# Patient Record
Sex: MALE | Race: WHITE | NOT HISPANIC OR LATINO | Employment: FULL TIME | URBAN - METROPOLITAN AREA
[De-identification: names, ages, dates, MRNs, and addresses within clinical notes are randomized per-mention and may not be internally consistent; named-entity substitution may affect disease eponyms.]

---

## 2021-07-31 ENCOUNTER — APPOINTMENT (EMERGENCY)
Dept: RADIOLOGY | Facility: HOSPITAL | Age: 51
End: 2021-07-31

## 2021-07-31 ENCOUNTER — HOSPITAL ENCOUNTER (EMERGENCY)
Facility: HOSPITAL | Age: 51
Discharge: HOME | End: 2021-07-31
Attending: EMERGENCY MEDICINE | Admitting: EMERGENCY MEDICINE

## 2021-07-31 VITALS
TEMPERATURE: 98.2 F | BODY MASS INDEX: 32.47 KG/M2 | HEIGHT: 66 IN | DIASTOLIC BLOOD PRESSURE: 98 MMHG | OXYGEN SATURATION: 97 % | WEIGHT: 202 LBS | HEART RATE: 91 BPM | SYSTOLIC BLOOD PRESSURE: 147 MMHG | RESPIRATION RATE: 16 BRPM

## 2021-07-31 DIAGNOSIS — I10 HYPERTENSION: ICD-10-CM

## 2021-07-31 DIAGNOSIS — R55 SYNCOPE: Primary | ICD-10-CM

## 2021-07-31 LAB
ALBUMIN SERPL BCP-MCNC: 3.8 G/DL (ref 3.5–5)
ALP SERPL-CCNC: 69 U/L (ref 46–116)
ALT SERPL W P-5'-P-CCNC: 41 U/L (ref 12–78)
ANION GAP SERPL CALCULATED.3IONS-SCNC: 6 MMOL/L (ref 4–13)
AST SERPL W P-5'-P-CCNC: 17 U/L (ref 5–45)
BASOPHILS # BLD AUTO: 0.09 THOUSANDS/ΜL (ref 0–0.1)
BASOPHILS NFR BLD AUTO: 1 % (ref 0–1)
BILIRUB SERPL-MCNC: 0.37 MG/DL (ref 0.2–1)
BUN SERPL-MCNC: 17 MG/DL (ref 5–25)
CALCIUM SERPL-MCNC: 8.7 MG/DL (ref 8.3–10.1)
CHLORIDE SERPL-SCNC: 105 MMOL/L (ref 100–108)
CO2 SERPL-SCNC: 26 MMOL/L (ref 21–32)
CREAT SERPL-MCNC: 1.05 MG/DL (ref 0.6–1.3)
EOSINOPHIL # BLD AUTO: 0.31 THOUSAND/ΜL (ref 0–0.61)
EOSINOPHIL NFR BLD AUTO: 3 % (ref 0–6)
ERYTHROCYTE [DISTWIDTH] IN BLOOD BY AUTOMATED COUNT: 12.1 % (ref 11.6–15.1)
GFR SERPL CREATININE-BSD FRML MDRD: 82 ML/MIN/1.73SQ M
GLUCOSE SERPL-MCNC: 141 MG/DL (ref 65–140)
HCT VFR BLD AUTO: 47 % (ref 36.5–49.3)
HGB BLD-MCNC: 16.1 G/DL (ref 12–17)
IMM GRANULOCYTES # BLD AUTO: 0.05 THOUSAND/UL (ref 0–0.2)
IMM GRANULOCYTES NFR BLD AUTO: 1 % (ref 0–2)
LYMPHOCYTES # BLD AUTO: 3.29 THOUSANDS/ΜL (ref 0.6–4.47)
LYMPHOCYTES NFR BLD AUTO: 30 % (ref 14–44)
MCH RBC QN AUTO: 31.1 PG (ref 26.8–34.3)
MCHC RBC AUTO-ENTMCNC: 34.3 G/DL (ref 31.4–37.4)
MCV RBC AUTO: 91 FL (ref 82–98)
MONOCYTES # BLD AUTO: 1.27 THOUSAND/ΜL (ref 0.17–1.22)
MONOCYTES NFR BLD AUTO: 12 % (ref 4–12)
NEUTROPHILS # BLD AUTO: 6 THOUSANDS/ΜL (ref 1.85–7.62)
NEUTS SEG NFR BLD AUTO: 53 % (ref 43–75)
NRBC BLD AUTO-RTO: 0 /100 WBCS
PLATELET # BLD AUTO: 320 THOUSANDS/UL (ref 149–390)
PMV BLD AUTO: 9.6 FL (ref 8.9–12.7)
POTASSIUM SERPL-SCNC: 4.2 MMOL/L (ref 3.5–5.3)
PROT SERPL-MCNC: 7.4 G/DL (ref 6.4–8.2)
RBC # BLD AUTO: 5.17 MILLION/UL (ref 3.88–5.62)
SODIUM SERPL-SCNC: 137 MMOL/L (ref 136–145)
TROPONIN I SERPL-MCNC: <0.02 NG/ML
WBC # BLD AUTO: 11.01 THOUSAND/UL (ref 4.31–10.16)

## 2021-07-31 PROCEDURE — 36415 COLL VENOUS BLD VENIPUNCTURE: CPT | Performed by: EMERGENCY MEDICINE

## 2021-07-31 PROCEDURE — 85025 COMPLETE CBC W/AUTO DIFF WBC: CPT | Performed by: EMERGENCY MEDICINE

## 2021-07-31 PROCEDURE — 71045 X-RAY EXAM CHEST 1 VIEW: CPT

## 2021-07-31 PROCEDURE — 99285 EMERGENCY DEPT VISIT HI MDM: CPT | Performed by: EMERGENCY MEDICINE

## 2021-07-31 PROCEDURE — 84484 ASSAY OF TROPONIN QUANT: CPT | Performed by: EMERGENCY MEDICINE

## 2021-07-31 PROCEDURE — 80053 COMPREHEN METABOLIC PANEL: CPT | Performed by: EMERGENCY MEDICINE

## 2021-07-31 PROCEDURE — 99284 EMERGENCY DEPT VISIT MOD MDM: CPT

## 2021-07-31 PROCEDURE — 93005 ELECTROCARDIOGRAM TRACING: CPT

## 2021-07-31 RX ORDER — AMLODIPINE BESYLATE AND ATORVASTATIN CALCIUM 10; 10 MG/1; MG/1
1 TABLET, FILM COATED ORAL DAILY
COMMUNITY
End: 2021-11-15 | Stop reason: SDUPTHER

## 2021-08-01 LAB
ATRIAL RATE: 77 BPM
P AXIS: 43 DEGREES
PR INTERVAL: 164 MS
QRS AXIS: 42 DEGREES
QRSD INTERVAL: 88 MS
QT INTERVAL: 358 MS
QTC INTERVAL: 405 MS
T WAVE AXIS: 33 DEGREES
VENTRICULAR RATE: 77 BPM

## 2021-08-01 PROCEDURE — 93010 ELECTROCARDIOGRAM REPORT: CPT | Performed by: INTERNAL MEDICINE

## 2021-08-01 NOTE — ED PROVIDER NOTES
History  Chief Complaint   Patient presents with    Syncope     Pt had syncopal episode after taking amlodipine  Pt recently placed back on BP medication and took an extra dose due to high blood pressure  Denies head strike     70-year-old male with hypertension presents to the ED for evaluation after syncopal event  Patient states he was recently placed back on amlodipine for high blood pressure  He states he was checking his blood pressure at home and was noted to be hypertensive so he took an additional dose of amlodipine  After that, felt lightheaded had a syncopal event  He denies hitting his head  No seizure-like activity  No bowel or bladder incontinence  No tongue biting  No postictal state  He denies any preceding palpitations, chest pain, or shortness of breath  No abdominal pain, nausea or vomiting  No fever or chills  Prior to Admission Medications   Prescriptions Last Dose Informant Patient Reported? Taking? amLODIPine-atorvastatin (CADUET) 10-10 MG per tablet  Self Yes Yes   Sig: Take 1 tablet by mouth daily      Facility-Administered Medications: None       Past Medical History:   Diagnosis Date    Cancer ([facility] [address] )     Hypertension        History reviewed  No pertinent surgical history  History reviewed  No pertinent family history  I have reviewed and agree with the history as documented  E-Cigarette/Vaping    E-Cigarette Use Current Every Day User      E-Cigarette/Vaping Substances    Nicotine Yes      Social History     Tobacco Use    Smoking status: Current Some Day Smoker     Packs/day: 0 25     Types: Cigarettes    Smokeless tobacco: Never Used   Vaping Use    Vaping Use: Every day    Substances: Nicotine   Substance Use Topics    Alcohol use: Yes     Comment: socially    Drug use: Never        Review of Systems   Constitutional: Negative for chills and fever  Respiratory: Negative for shortness of breath      Cardiovascular: Negative for chest pain and palpitations  Neurological: Positive for syncope and light-headedness  All other systems reviewed and are negative  Physical Exam  ED Triage Vitals   Temperature Pulse Respirations Blood Pressure SpO2   07/31/21 2043 07/31/21 2037 07/31/21 2037 07/31/21 2037 07/31/21 2037   98 2 °F (36 8 °C) 91 16 147/98 97 %      Temp Source Heart Rate Source Patient Position - Orthostatic VS BP Location FiO2 (%)   07/31/21 2043 07/31/21 2037 07/31/21 2037 07/31/21 2037 --   Tympanic Monitor Lying Right arm       Pain Score       --                    Orthostatic Vital Signs  Vitals:    07/31/21 2037   BP: 147/98   Pulse: 91   Patient Position - Orthostatic VS: Lying       Physical Exam  Vitals and nursing note reviewed  Constitutional:       General: He is not in acute distress  Appearance: Normal appearance  HENT:      Head: Normocephalic and atraumatic  Right Ear: External ear normal       Left Ear: External ear normal       Nose: Nose normal       Mouth/Throat:      Mouth: Mucous membranes are moist    Eyes:      Extraocular Movements: Extraocular movements intact  Pupils: Pupils are equal, round, and reactive to light  Comments: Injected conjunctiva   Cardiovascular:      Rate and Rhythm: Normal rate and regular rhythm  Pulses: Normal pulses  Heart sounds: No murmur heard  Pulmonary:      Effort: Pulmonary effort is normal  No respiratory distress  Breath sounds: Normal breath sounds  No stridor  No wheezing  Abdominal:      General: Abdomen is flat  Bowel sounds are normal       Tenderness: There is no abdominal tenderness  There is no guarding or rebound  Musculoskeletal:         General: No deformity  Normal range of motion  Cervical back: Normal range of motion and neck supple  Skin:     General: Skin is warm and dry  Capillary Refill: Capillary refill takes less than 2 seconds  Neurological:      General: No focal deficit present        Mental Status: He is alert and oriented to person, place, and time     Psychiatric:         Mood and Affect: Mood normal          Behavior: Behavior normal          ED Medications  Medications - No data to display    Diagnostic Studies  Results Reviewed     Procedure Component Value Units Date/Time    Comprehensive metabolic panel [[id number]]  (Abnormal) Collected: 07/31/21 2107    Lab Status: Final result Specimen: Blood from Arm, Left Updated: 07/31/21 2147     Sodium 137 mmol/L      Potassium 4 2 mmol/L      Chloride 105 mmol/L      CO2 26 mmol/L      ANION GAP 6 mmol/L      BUN 17 mg/dL      Creatinine 1 05 mg/dL      Glucose 141 mg/dL      Calcium 8 7 mg/dL      AST 17 U/L      ALT 41 U/L      Alkaline Phosphatase 69 U/L      Total Protein 7 4 g/dL      Albumin 3 8 g/dL      Total Bilirubin 0 37 mg/dL      eGFR 82 ml/min/1 73sq m     Narrative:      [facility] guidelines for Chronic Kidney Disease (CKD):     Stage 1 with normal or high GFR (GFR > 90 mL/min/1 73 square meters)    Stage 2 Mild CKD (GFR = 60-89 mL/min/1 73 square meters)    Stage 3A Moderate CKD (GFR = 45-59 mL/min/1 73 square meters)    Stage 3B Moderate CKD (GFR = 30-44 mL/min/1 73 square meters)    Stage 4 Severe CKD (GFR = 15-29 mL/min/1 73 square meters)    Stage 5 End Stage CKD (GFR <15 mL/min/1 73 square meters)  Note: GFR calculation is accurate only with a steady state creatinine    Troponin I [[id number]]  (Normal) Collected: 07/31/21 2107    Lab Status: Final result Specimen: Blood from Arm, Left Updated: 07/31/21 2147     Troponin I <0 02 ng/mL     CBC and differential [[id number]]  (Abnormal) Collected: 07/31/21 2107    Lab Status: Final result Specimen: Blood from Arm, Left Updated: 07/31/21 2113     WBC 11 01 Thousand/uL      RBC 5 17 Million/uL      Hemoglobin 16 1 g/dL      Hematocrit 47 0 %      MCV 91 fL      MCH 31 1 pg      MCHC 34 3 g/dL      RDW 12 1 %      MPV 9 6 fL      Platelets 303 Thousands/uL      nRBC 0 /100 WBCs      Neutrophils Relative 53 %      Immat GRANS % 1 %      Lymphocytes Relative 30 %      Monocytes Relative 12 %      Eosinophils Relative 3 %      Basophils Relative 1 %      Neutrophils Absolute 6 00 Thousands/µL      Immature Grans Absolute 0 05 Thousand/uL      Lymphocytes Absolute 3 29 Thousands/µL      Monocytes Absolute 1 27 Thousand/µL      Eosinophils Absolute 0 31 Thousand/µL      Basophils Absolute 0 09 Thousands/µL                  XR chest 1 view portable   ED Interpretation by Taty Membreno MD (07/31 2211)   No acute cardiopulmonary abnormality            Procedures  ECG 12 Lead Documentation Only    Date/Time: 7/31/2021 9:21 PM  Performed by: Taty Membreno MD  Authorized by: Taty Membreno MD     Indications / Diagnosis:  Syncope  ECG reviewed by me, the ED Provider: yes    Patient location:  ED  Previous ECG:     Previous ECG:  Unavailable  Interpretation:     Interpretation: normal    Rate:     ECG rate:  77    ECG rate assessment: normal    Rhythm:     Rhythm: sinus rhythm    Ectopy:     Ectopy: none    QRS:     QRS axis:  Normal  Conduction:     Conduction: normal    ST segments:     ST segments:  Normal  T waves:     T waves: normal            ED Course  ED Course as of Aug 01 0011   Sat Jul 31, 2021 2045 Blood Pressure: 147/98   2045 Temperature: 98 2 °F (36 8 °C)   2045 Pulse: 91   2045 Respirations: 16   2045 SpO2: 97 %                                       MDM  Number of Diagnoses or Management Options  Hypertension  Syncope  Diagnosis management comments: 63-year-old male presents the ED for evaluation of syncope after taking an extra dose of blood pressure medication  On evaluation, patient is overall well appearing no acute distress  Heart is regular rate rhythm without murmurs  Will check cardiac workup and likely discharge home  Patient's labs grossly unremarkable  EKG demonstrated normal sinus rhythm without ischemic changes  Patient remains asymptomatic    Blood pressure is stable  He was advised to take his blood pressure medication only as prescribed and to follow-up with primary care physician  He was given strict return precautions  Disposition  Final diagnoses:   Syncope   Hypertension     Time reflects when diagnosis was documented in both MDM as applicable and the Disposition within this note     Time User Action Codes Description Comment    7/31/2021 10:07 PM Check, Ivy Blake Add [R55] Syncope     7/31/2021 10:07 PM Check, Ivy Blake Add [I10] Hypertension       ED Disposition     ED Disposition Condition Date/Time Comment    Discharge Stable Sat Jul 31, 2021 10:08 PM Reyna Kwan discharge to home/self care  Follow-up Information     Follow up With Specialties Details Why Contact Info Additional [address] [facility] Emergency Medicine  If symptoms worsen [address]  [address] Emergency Department, [address] [place] [address], [id number]   [phone]          Discharge Medication List as of 7/31/2021 10:09 PM      CONTINUE these medications which have NOT CHANGED    Details   amLODIPine-atorvastatin (CADUET) 10-10 MG per tablet Take 1 tablet by mouth daily, Historical Med           No discharge procedures on file  PDMP Review     None           ED Provider  Attending physically available and evaluated Reyna Kwan I managed the patient along with the ED Attending      Electronically Signed by         Jose Thompson MD  08/01/21 5539

## 2021-08-01 NOTE — DISCHARGE INSTRUCTIONS
Return to the emergency department if you experience another episode of passing out, chest pain or difficulty breathing  Taking amlodipine as prescribed, do not take any extra doses until speaking with primary care physician  Follow-up with primary care physician in 1 week

## 2021-08-01 NOTE — ED ATTENDING ATTESTATION
7/31/2021  Astrid Pastor DO, saw and evaluated the patient  I have discussed the patient with the resident/non-physician practitioner and agree with the resident's/non-physician practitioner's findings, Plan of Care, and MDM as documented in the resident's/non-physician practitioner's note, except where noted  All available labs and Radiology studies were reviewed  I was present for key portions of any procedure(s) performed by the resident/non-physician practitioner and I was immediately available to provide assistance  At this point I agree with the current assessment done in the Emergency Department  I have conducted an independent evaluation of this patient a history and physical is as follows:    63-year-old male presents by ambulance for evaluation status post syncopal event while outside at a concert venue today  Of note he was recently restarted on amlodipine  He checked his pressure earlier today and took an extra amlodipine  He currently feels well  He had preceding lightheadedness, pallor, diaphoresis and nausea  Patient offers no complaints this time including chest pain, leg pain or swelling, shortness of breath  Denies headache, abdominal pain  Impression:  Syncope likely multifactorial given extra amlodipine does, standing at a concert  Plan:  EKG, labs, reassess    Likely discharge home      ED Course         Critical Care Time  Procedures

## 2021-09-14 ENCOUNTER — APPOINTMENT (OUTPATIENT)
Dept: LAB | Facility: CLINIC | Age: 51
End: 2021-09-14
Payer: COMMERCIAL

## 2021-09-14 ENCOUNTER — OFFICE VISIT (OUTPATIENT)
Dept: FAMILY MEDICINE CLINIC | Facility: CLINIC | Age: 51
End: 2021-09-14
Payer: COMMERCIAL

## 2021-09-14 VITALS
SYSTOLIC BLOOD PRESSURE: 116 MMHG | TEMPERATURE: 97.6 F | HEIGHT: 66 IN | DIASTOLIC BLOOD PRESSURE: 80 MMHG | BODY MASS INDEX: 32.59 KG/M2 | HEART RATE: 81 BPM | OXYGEN SATURATION: 100 % | WEIGHT: 202.8 LBS | RESPIRATION RATE: 16 BRPM

## 2021-09-14 DIAGNOSIS — M79.89 LEG SWELLING: ICD-10-CM

## 2021-09-14 DIAGNOSIS — E78.5 HYPERLIPIDEMIA, UNSPECIFIED HYPERLIPIDEMIA TYPE: ICD-10-CM

## 2021-09-14 DIAGNOSIS — Z00.00 ANNUAL PHYSICAL EXAM: ICD-10-CM

## 2021-09-14 DIAGNOSIS — Z12.5 SCREENING PSA (PROSTATE SPECIFIC ANTIGEN): ICD-10-CM

## 2021-09-14 DIAGNOSIS — C02.9 TONGUE CANCER (HCC): ICD-10-CM

## 2021-09-14 DIAGNOSIS — Z00.00 ANNUAL PHYSICAL EXAM: Primary | ICD-10-CM

## 2021-09-14 DIAGNOSIS — I10 ESSENTIAL HYPERTENSION: ICD-10-CM

## 2021-09-14 DIAGNOSIS — R07.9 CHEST PAIN, UNSPECIFIED TYPE: ICD-10-CM

## 2021-09-14 DIAGNOSIS — Z12.11 SCREENING FOR COLON CANCER: ICD-10-CM

## 2021-09-14 DIAGNOSIS — E89.0 POSTOPERATIVE HYPOTHYROIDISM: ICD-10-CM

## 2021-09-14 LAB
ALBUMIN SERPL BCP-MCNC: 4.2 G/DL (ref 3.5–5)
ALP SERPL-CCNC: 93 U/L (ref 46–116)
ALT SERPL W P-5'-P-CCNC: 69 U/L (ref 12–78)
ANION GAP SERPL CALCULATED.3IONS-SCNC: 4 MMOL/L (ref 4–13)
AST SERPL W P-5'-P-CCNC: 29 U/L (ref 5–45)
BASOPHILS # BLD AUTO: 0.09 THOUSANDS/ΜL (ref 0–0.1)
BASOPHILS NFR BLD AUTO: 1 % (ref 0–1)
BILIRUB SERPL-MCNC: 0.68 MG/DL (ref 0.2–1)
BILIRUB UR QL STRIP: NEGATIVE
BUN SERPL-MCNC: 15 MG/DL (ref 5–25)
CALCIUM SERPL-MCNC: 9 MG/DL (ref 8.3–10.1)
CHLORIDE SERPL-SCNC: 104 MMOL/L (ref 100–108)
CHOLEST SERPL-MCNC: 180 MG/DL (ref 50–200)
CLARITY UR: CLEAR
CO2 SERPL-SCNC: 28 MMOL/L (ref 21–32)
COLOR UR: YELLOW
CREAT SERPL-MCNC: 0.98 MG/DL (ref 0.6–1.3)
EOSINOPHIL # BLD AUTO: 0.22 THOUSAND/ΜL (ref 0–0.61)
EOSINOPHIL NFR BLD AUTO: 2 % (ref 0–6)
ERYTHROCYTE [DISTWIDTH] IN BLOOD BY AUTOMATED COUNT: 12.3 % (ref 11.6–15.1)
GFR SERPL CREATININE-BSD FRML MDRD: 89 ML/MIN/1.73SQ M
GLUCOSE P FAST SERPL-MCNC: 168 MG/DL (ref 65–99)
GLUCOSE UR STRIP-MCNC: ABNORMAL MG/DL
HCT VFR BLD AUTO: 49.1 % (ref 36.5–49.3)
HDLC SERPL-MCNC: 50 MG/DL
HGB BLD-MCNC: 16.6 G/DL (ref 12–17)
HGB UR QL STRIP.AUTO: NEGATIVE
IMM GRANULOCYTES # BLD AUTO: 0.04 THOUSAND/UL (ref 0–0.2)
IMM GRANULOCYTES NFR BLD AUTO: 0 % (ref 0–2)
KETONES UR STRIP-MCNC: NEGATIVE MG/DL
LDLC SERPL CALC-MCNC: 99 MG/DL (ref 0–100)
LEUKOCYTE ESTERASE UR QL STRIP: NEGATIVE
LYMPHOCYTES # BLD AUTO: 1.86 THOUSANDS/ΜL (ref 0.6–4.47)
LYMPHOCYTES NFR BLD AUTO: 20 % (ref 14–44)
MCH RBC QN AUTO: 30.8 PG (ref 26.8–34.3)
MCHC RBC AUTO-ENTMCNC: 33.8 G/DL (ref 31.4–37.4)
MCV RBC AUTO: 91 FL (ref 82–98)
MONOCYTES # BLD AUTO: 0.99 THOUSAND/ΜL (ref 0.17–1.22)
MONOCYTES NFR BLD AUTO: 11 % (ref 4–12)
NEUTROPHILS # BLD AUTO: 6.06 THOUSANDS/ΜL (ref 1.85–7.62)
NEUTS SEG NFR BLD AUTO: 66 % (ref 43–75)
NITRITE UR QL STRIP: NEGATIVE
NONHDLC SERPL-MCNC: 130 MG/DL
NRBC BLD AUTO-RTO: 0 /100 WBCS
NT-PROBNP SERPL-MCNC: 12 PG/ML
PH UR STRIP.AUTO: 6 [PH]
PLATELET # BLD AUTO: 353 THOUSANDS/UL (ref 149–390)
PMV BLD AUTO: 9.6 FL (ref 8.9–12.7)
POTASSIUM SERPL-SCNC: 3.6 MMOL/L (ref 3.5–5.3)
PROT SERPL-MCNC: 7.7 G/DL (ref 6.4–8.2)
PROT UR STRIP-MCNC: NEGATIVE MG/DL
PSA SERPL-MCNC: 0.7 NG/ML (ref 0–4)
RBC # BLD AUTO: 5.39 MILLION/UL (ref 3.88–5.62)
SODIUM SERPL-SCNC: 136 MMOL/L (ref 136–145)
SP GR UR STRIP.AUTO: 1.02 (ref 1–1.03)
T4 FREE SERPL-MCNC: 0.71 NG/DL (ref 0.76–1.46)
TRIGL SERPL-MCNC: 154 MG/DL
TSH SERPL DL<=0.05 MIU/L-ACNC: 8.32 UIU/ML (ref 0.36–3.74)
UROBILINOGEN UR QL STRIP.AUTO: 0.2 E.U./DL
WBC # BLD AUTO: 9.26 THOUSAND/UL (ref 4.31–10.16)

## 2021-09-14 PROCEDURE — 80053 COMPREHEN METABOLIC PANEL: CPT

## 2021-09-14 PROCEDURE — 81003 URINALYSIS AUTO W/O SCOPE: CPT

## 2021-09-14 PROCEDURE — G0103 PSA SCREENING: HCPCS

## 2021-09-14 PROCEDURE — 85025 COMPLETE CBC W/AUTO DIFF WBC: CPT

## 2021-09-14 PROCEDURE — 36415 COLL VENOUS BLD VENIPUNCTURE: CPT

## 2021-09-14 PROCEDURE — 84439 ASSAY OF FREE THYROXINE: CPT

## 2021-09-14 PROCEDURE — 83880 ASSAY OF NATRIURETIC PEPTIDE: CPT

## 2021-09-14 PROCEDURE — 80061 LIPID PANEL: CPT

## 2021-09-14 PROCEDURE — 99386 PREV VISIT NEW AGE 40-64: CPT | Performed by: FAMILY MEDICINE

## 2021-09-14 PROCEDURE — 84443 ASSAY THYROID STIM HORMONE: CPT

## 2021-09-14 PROCEDURE — 3725F SCREEN DEPRESSION PERFORMED: CPT | Performed by: FAMILY MEDICINE

## 2021-09-14 PROCEDURE — 3008F BODY MASS INDEX DOCD: CPT | Performed by: FAMILY MEDICINE

## 2021-09-14 RX ORDER — LEVOTHYROXINE SODIUM 0.03 MG/1
25 TABLET ORAL DAILY
COMMUNITY
End: 2021-09-21 | Stop reason: SDUPTHER

## 2021-09-14 RX ORDER — SILDENAFIL 50 MG/1
50 TABLET, FILM COATED ORAL DAILY PRN
COMMUNITY
End: 2021-11-15 | Stop reason: SDUPTHER

## 2021-09-14 NOTE — PATIENT INSTRUCTIONS

## 2021-09-14 NOTE — PROGRESS NOTES
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[facility])       Endocrine    Postoperative hypothyroidism    Relevant Medications    levothyroxine 25 mcg tablet    Other Relevant Orders    TSH, 3rd generation with Free T4 reflex      Other Visit Diagnoses     Annual physical exam    -  Primary    Relevant Orders    Comprehensive metabolic panel    Lipid panel    CBC and differential    UA w Reflex to Microscopic w Reflex to Culture -Lab Collect    Hyperlipidemia, unspecified hyperlipidemia type        Relevant Orders    Lipid panel    Essential hypertension        Relevant Orders    NT-BNP PRO    Ambulatory referral to Cardiology    Echo complete with contrast if indicated    Leg swelling        Relevant Orders    NT-BNP PRO    UA w Reflex to Microscopic w Reflex to Culture -Lab Collect    Ambulatory referral to Cardiology    Echo complete with contrast if indicated    Screening PSA (prostate specific antigen)        Relevant Orders    PSA, Total Screen    Chest pain, unspecified type        Screening for colon cancer        Relevant Orders    Ambulatory referral to Gastroenterology      1  Tongue cancer in remission  2  Leg swelling/ chest pain referred to Cardiology/ ordered BNP/echocardiogram to be evaluated  3  Hypertension currently well managed continue medications as prescribed advised the patient that if blood work and workup is negative we will likely discontinue amlodipine  4  Repeat lipid panel  5  Repeat TSH  6  Sent for colonoscopy    Immunizations and preventive care screenings were discussed with patient today  Appropriate education was printed on patient's after visit summary  BMI Counseling: Body mass index is 32 73 kg/m²   The BMI is above normal  Nutrition recommendations include decreasing fast food intake and consuming healthier snacks  Exercise recommendations include exercising 3-5 times per week  Tobacco Cessation Counseling: Tobacco cessation counseling was provided  The patient is sincerely urged to quit consumption of tobacco  He is ready to quit tobacco        No follow-ups on file  Chief Complaint:     Chief Complaint   Patient presents with   Darrel Paredes Establish Care     patient here to establish care and have annual exam    Physical Exam      History of Present Illness:     Adult Annual Physical   Patient here for a comprehensive physical exam     Patient ran out of med the 4-5 days prior to the below event  ED visit: 189/120; 158/108-> took another half of his amlodipine and it lower his BP very low  HR very low  ED sent him home  Work up normal      Thyroid disease secondary to radiation    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;  Surgery nov 2011  Graduated from [facility]-> last appt 2016    HLD-> Atorvastatin 2016      Patient states that he has had a little bit of chest pain that comes as a sharp pattern on his left side of his heart that comes and goes  Patient states that he does have a family history of congestive heart failure concerned about this  Has been on amlodipine for multiple years with no side effects prior to this  Swelling in the last couple weeks  Diet and Physical Activity  · Diet/Nutrition: poor diet  · Exercise: no formal exercise  Depression Screening  PHQ-9 Depression Screening    PHQ-9:   Frequency of the following problems over the past two weeks:      Little interest or pleasure in doing things: 0 - not at all  Feeling down, depressed, or hopeless: 0 - not at all  PHQ-2 Score: 0       General Health  · Sleep: sleeps well  · Hearing: normal - bilateral   · Vision: vision problems: eye doctor Oct 5th  · Dental: regular dental visits          Health  · Symptoms include: none Review of Systems:     Review of Systems   Cardiovascular: Positive for chest pain (comes and goes sharp pains) and leg swelling  Past Medical History:     Past Medical History:   Diagnosis Date    Cancer ([address] )     Hypertension       Past Surgical History:     Past Surgical History:   Procedure Laterality Date    KNEE SURGERY        Family History:     Family History   Problem Relation Age of Onset   Stephanie Gleason Breast cancer Mother     Hypertension Father     Cancer Paternal Grandmother       Social History:     Social History     Socioeconomic History    Marital status:      Spouse name: None    Number of children: None    Years of education: None    Highest education level: None   Occupational History    None   Tobacco Use    Smoking status: Current Some Day Smoker     Packs/day: 0 25     Types: Cigarettes    Smokeless tobacco: Never Used    Tobacco comment: Rarley smokes   Vaping Use    Vaping Use: Every day    Substances: Nicotine   Substance and Sexual Activity    Alcohol use: Yes     Comment: socially    Drug use: Never    Sexual activity: Yes   Other Topics Concern    None   Social History Narrative    None     Social Determinants of Health     Financial Resource Strain:     Difficulty of Paying Living Expenses:    Food Insecurity:     Worried About Running Out of Food in the Last Year:     Ran Out of Food in the Last Year:    Transportation Needs:     Lack of Transportation (Medical):      Lack of Transportation (Non-Medical):    Physical Activity:     Days of Exercise per Week:     Minutes of Exercise per Session:    Stress:     Feeling of Stress :    Social Connections:     Frequency of Communication with Friends and Family:     Frequency of Social Gatherings with Friends and Family:     Attends Hindu Services:     Active Member of Clubs or Organizations:     Attends Club or Organization Meetings:     Marital Status:    Intimate Partner Violence:     Fear of Current or Ex-Partner:     Emotionally Abused:     Physically Abused:     Sexually Abused:       Current Medications:     Current Outpatient Medications   Medication Sig Dispense Refill    amLODIPine-atorvastatin (CADUET) 10-10 MG per tablet Take 1 tablet by mouth daily      levothyroxine 25 mcg tablet Take 25 mcg by mouth daily      sildenafil (VIAGRA) 50 MG tablet Take 50 mg by mouth daily as needed for erectile dysfunction       No current facility-administered medications for this visit  Allergies:     No Known Allergies   Physical Exam:     /80 (BP Location: Left arm, Patient Position: Sitting, Cuff Size: Standard)   Pulse 81   Temp 97 6 °F (36 4 °C) (Temporal)   Resp 16   Ht 5' 6" (1 676 m)   Wt 92 kg (202 lb 12 8 oz)   SpO2 100%   BMI 32 73 kg/m²     Physical Exam  Vitals reviewed  Constitutional:       Appearance: He is well-developed  HENT:      Head: Normocephalic and atraumatic  Right Ear: External ear normal       Left Ear: External ear normal       Nose: Nose normal    Eyes:      Conjunctiva/sclera: Conjunctivae normal       Pupils: Pupils are equal, round, and reactive to light  Cardiovascular:      Rate and Rhythm: Normal rate and regular rhythm  Heart sounds: Normal heart sounds  Pulmonary:      Effort: Pulmonary effort is normal       Breath sounds: Normal breath sounds  No wheezing  Abdominal:      General: Bowel sounds are normal  There is no distension  Palpations: Abdomen is soft  There is no mass  Tenderness: There is no abdominal tenderness  Genitourinary:     Penis: Normal     Musculoskeletal:         General: No tenderness  Normal range of motion  Cervical back: Normal range of motion and neck supple  Right lower leg: Edema (trace) present  Left lower leg: Edema (trace) present  Skin:     General: Skin is warm  Capillary Refill: Capillary refill takes less than 2 seconds     Neurological:      Mental Status: He is alert and oriented to person, place, and time  Cranial Nerves: No cranial nerve deficit  Sensory: No sensory deficit  Motor: No abnormal muscle tone  Coordination: Coordination normal       Deep Tendon Reflexes: Reflexes normal    Psychiatric:         Behavior: Behavior normal          Thought Content:  Thought content normal          Judgment: Judgment normal           Xavier Kate MD  [address]

## 2021-09-16 ENCOUNTER — TELEPHONE (OUTPATIENT)
Dept: FAMILY MEDICINE CLINIC | Facility: CLINIC | Age: 51
End: 2021-09-16

## 2021-09-16 NOTE — TELEPHONE ENCOUNTER
Are you able to call the patient and advised him if he can go downstairs for an A1c; please advise him that his sugars were slightly abnormal and I need this in order to assess him better        Tell him that it was not able to be added on given that the specimen was already discarded

## 2021-09-16 NOTE — TELEPHONE ENCOUNTER
Patient returned Dr Rhonda Gooden call  Per Dr Rhonda Gooden I advised patient that she is ordering additional labs for patient and will email him lab order  Scheduled him for virtual appointment 9/21  Is it just A1c that is being ordered?

## 2021-09-16 NOTE — TELEPHONE ENCOUNTER
----- Message from Lisa Madrigal MD sent at 9/16/2021  2:29 PM EDT -----   Can we please call the lab and advised him that I wanted to add on an A1c  The patient went to [facility] lab  Left a voice message for the patient to return our phone call so I can discuss with him results    Please advise the patient that I likely will need to see him next week to review more in detail

## 2021-09-17 ENCOUNTER — LAB (OUTPATIENT)
Dept: LAB | Facility: CLINIC | Age: 51
End: 2021-09-17
Payer: COMMERCIAL

## 2021-09-17 DIAGNOSIS — R73.09 ABNORMAL GLUCOSE: ICD-10-CM

## 2021-09-17 LAB
EST. AVERAGE GLUCOSE BLD GHB EST-MCNC: 189 MG/DL
HBA1C MFR BLD: 8.2 %

## 2021-09-17 PROCEDURE — 3052F HG A1C>EQUAL 8.0%<EQUAL 9.0%: CPT | Performed by: FAMILY MEDICINE

## 2021-09-17 PROCEDURE — 83036 HEMOGLOBIN GLYCOSYLATED A1C: CPT

## 2021-09-17 PROCEDURE — 36415 COLL VENOUS BLD VENIPUNCTURE: CPT

## 2021-09-21 ENCOUNTER — TELEMEDICINE (OUTPATIENT)
Dept: FAMILY MEDICINE CLINIC | Facility: CLINIC | Age: 51
End: 2021-09-21
Payer: COMMERCIAL

## 2021-09-21 DIAGNOSIS — E89.0 POSTOPERATIVE HYPOTHYROIDISM: ICD-10-CM

## 2021-09-21 DIAGNOSIS — E11.9 TYPE 2 DIABETES MELLITUS WITHOUT COMPLICATION, WITHOUT LONG-TERM CURRENT USE OF INSULIN (HCC): Primary | ICD-10-CM

## 2021-09-21 PROCEDURE — 4004F PT TOBACCO SCREEN RCVD TLK: CPT | Performed by: FAMILY MEDICINE

## 2021-09-21 PROCEDURE — 99214 OFFICE O/P EST MOD 30 MIN: CPT | Performed by: FAMILY MEDICINE

## 2021-09-21 RX ORDER — LEVOTHYROXINE SODIUM 0.03 MG/1
25 TABLET ORAL DAILY
Qty: 90 TABLET | Refills: 3 | Status: SHIPPED | OUTPATIENT
Start: 2021-09-21 | End: 2021-12-14 | Stop reason: SDUPTHER

## 2021-09-21 RX ORDER — LEVOTHYROXINE SODIUM 0.05 MG/1
50 TABLET ORAL DAILY
Qty: 60 TABLET | Refills: 0 | Status: SHIPPED | OUTPATIENT
Start: 2021-09-21 | End: 2021-09-21 | Stop reason: SDUPTHER

## 2021-09-21 NOTE — PROGRESS NOTES
Virtual Regular Visit    Verification of patient location:    Patient is located in the following state in which I hold an active license [place]      Assessment/Plan:    Problem List Items Addressed This Visit        Endocrine    Postoperative hypothyroidism    Relevant Medications    levothyroxine 25 mcg tablet      Other Visit Diagnoses     Type 2 diabetes mellitus without complication, without long-term current use of insulin (HCC)    -  Primary    Relevant Medications    metFORMIN (GLUCOPHAGE) 500 mg tablet      Orginally was going to switch medication to 50mcg of levothyroxine, however patient admitted to not having any for the last 2 weeks  Therefore makes sense why his levels are high  Type 2 DM LONG discussion about new dx made  Recommend Returning in 3 months to see how patient is responding to medications  Discussed s/e to medication  Discussed at length diet and lifestyle changes  Reason for visit is   Chief Complaint   Patient presents with    Virtual Regular Visit        Encounter provider Masha Urrutia MD    Provider located at [address] [place] [id number]      Recent Visits  Date Type Provider Dept   09/16/21 Telephone [address]    09/14/21 Office Visit Masha Urrutia MD [address] [place] [address] recent visits within past 7 days and meeting all other requirements  Today's Visits  Date Type Provider Dept   09/21/21 Telemedicine Masha Urrutia MD [address] [place] [address] today's visits and meeting all other requirements  Future Appointments  No visits were found meeting these conditions  Showing future appointments within next 150 days and meeting all other requirements       The patient was identified by name and date of birth   Selma Ariza was informed that this is a telemedicine visit and that the visit is being conducted through [facility] and patient was informed that this is a secure, HIPAA-compliant platform  He agrees to proceed     My office door was closed  No one else was in the room  He acknowledged consent and understanding of privacy and security of the video platform  The patient has agreed to participate and understands they can discontinue the visit at any time  Patient is aware this is a billable service  Subjective  Christel Mendez is a 46 y o  male presents via video to discuss lab results  When he had cancer treatment in 2016 he had full work up  Never was mention that he was a diabetic  Patient states that his thyroid levels usually are always controlled  He has been out of meds for 2 weeks  Continues to have leg swelling, schedule to see cardiology        Past Medical History:   Diagnosis Date    Cancer ([facility] [address] [facility] )     Hypertension        Past Surgical History:   Procedure Laterality Date    KNEE SURGERY         Current Outpatient Medications   Medication Sig Dispense Refill    amLODIPine-atorvastatin (CADUET) 10-10 MG per tablet Take 1 tablet by mouth daily      levothyroxine 25 mcg tablet Take 1 tablet (25 mcg total) by mouth daily 90 tablet 3    metFORMIN (GLUCOPHAGE) 500 mg tablet Take 1 tablet (500 mg total) by mouth 2 (two) times a day with meals 180 tablet 2    sildenafil (VIAGRA) 50 MG tablet Take 50 mg by mouth daily as needed for erectile dysfunction       No current facility-administered medications for this visit  No Known Allergies    Review of Systems   Constitutional: Negative for activity change, appetite change, chills, fatigue and fever  HENT: Negative for congestion  Respiratory: Negative for cough, chest tightness and shortness of breath  Cardiovascular: Positive for leg swelling  Negative for chest pain  Gastrointestinal: Negative for abdominal distention, abdominal pain, constipation, diarrhea, nausea and vomiting  Endocrine: Negative for polydipsia and polyphagia  All other systems reviewed and are negative        Video Exam    There were no vitals filed for this visit  Physical Exam  Constitutional:       Appearance: Normal appearance  Pulmonary:      Effort: Pulmonary effort is normal    Musculoskeletal:         General: Normal range of motion  Neurological:      General: No focal deficit present  Mental Status: He is alert and oriented to person, place, and time  Psychiatric:         Mood and Affect: Mood normal          Behavior: Behavior normal          Thought Content: Thought content normal          Judgment: Judgment normal           I spent 30 minutes directly with the patient during this visit    VIRTUAL VISIT DISCLAIMER      Sudhir Delarosa verbally agrees to participate in [employer]  Pt is aware that [employer] could be limited without vital signs or the ability to perform a full hands-on physical exam  Sudhir Delarosa understands he or the provider may request at any time to terminate the video visit and request the patient to seek care or treatment in person

## 2021-10-05 ENCOUNTER — HOSPITAL ENCOUNTER (OUTPATIENT)
Dept: NON INVASIVE DIAGNOSTICS | Facility: HOSPITAL | Age: 51
Discharge: HOME | End: 2021-10-05
Attending: FAMILY MEDICINE
Payer: COMMERCIAL

## 2021-10-05 DIAGNOSIS — M79.89 LEG SWELLING: ICD-10-CM

## 2021-10-05 DIAGNOSIS — I10 ESSENTIAL HYPERTENSION: ICD-10-CM

## 2021-10-05 LAB
LEFT EYE DIABETIC RETINOPATHY: NORMAL
RIGHT EYE DIABETIC RETINOPATHY: NORMAL

## 2021-10-05 PROCEDURE — 93306 TTE W/DOPPLER COMPLETE: CPT

## 2021-10-07 PROCEDURE — 93306 TTE W/DOPPLER COMPLETE: CPT | Performed by: INTERNAL MEDICINE

## 2021-10-12 ENCOUNTER — TELEPHONE (OUTPATIENT)
Dept: FAMILY MEDICINE CLINIC | Facility: CLINIC | Age: 51
End: 2021-10-12

## 2021-10-15 ENCOUNTER — CONSULT (OUTPATIENT)
Dept: CARDIOLOGY CLINIC | Facility: CLINIC | Age: 51
End: 2021-10-15
Payer: COMMERCIAL

## 2021-10-15 VITALS
TEMPERATURE: 98.5 F | WEIGHT: 200 LBS | OXYGEN SATURATION: 99 % | BODY MASS INDEX: 32.14 KG/M2 | HEART RATE: 65 BPM | DIASTOLIC BLOOD PRESSURE: 90 MMHG | SYSTOLIC BLOOD PRESSURE: 138 MMHG | HEIGHT: 66 IN

## 2021-10-15 DIAGNOSIS — E11.9 TYPE 2 DIABETES MELLITUS WITHOUT COMPLICATION, WITHOUT LONG-TERM CURRENT USE OF INSULIN (HCC): ICD-10-CM

## 2021-10-15 DIAGNOSIS — M79.89 LEG SWELLING: ICD-10-CM

## 2021-10-15 DIAGNOSIS — I10 ESSENTIAL HYPERTENSION: Primary | ICD-10-CM

## 2021-10-15 PROCEDURE — 93000 ELECTROCARDIOGRAM COMPLETE: CPT | Performed by: INTERNAL MEDICINE

## 2021-10-15 PROCEDURE — 3008F BODY MASS INDEX DOCD: CPT | Performed by: INTERNAL MEDICINE

## 2021-10-15 PROCEDURE — 99243 OFF/OP CNSLTJ NEW/EST LOW 30: CPT | Performed by: INTERNAL MEDICINE

## 2021-10-15 PROCEDURE — 3080F DIAST BP >= 90 MM HG: CPT | Performed by: INTERNAL MEDICINE

## 2021-10-15 PROCEDURE — 3075F SYST BP GE 130 - 139MM HG: CPT | Performed by: INTERNAL MEDICINE

## 2021-10-15 PROCEDURE — 1036F TOBACCO NON-USER: CPT | Performed by: INTERNAL MEDICINE

## 2021-10-15 RX ORDER — MELATONIN
1000 WEEKLY
COMMUNITY
End: 2021-11-15 | Stop reason: SDUPTHER

## 2021-10-29 ENCOUNTER — TELEPHONE (OUTPATIENT)
Dept: OTHER | Facility: OTHER | Age: 51
End: 2021-10-29

## 2021-11-02 ENCOUNTER — OFFICE VISIT (OUTPATIENT)
Dept: GASTROENTEROLOGY | Facility: CLINIC | Age: 51
End: 2021-11-02
Payer: COMMERCIAL

## 2021-11-02 VITALS
HEART RATE: 72 BPM | BODY MASS INDEX: 31.98 KG/M2 | DIASTOLIC BLOOD PRESSURE: 85 MMHG | WEIGHT: 199 LBS | HEIGHT: 66 IN | SYSTOLIC BLOOD PRESSURE: 133 MMHG | TEMPERATURE: 97.2 F

## 2021-11-02 DIAGNOSIS — Z12.11 SCREENING FOR COLON CANCER: Primary | ICD-10-CM

## 2021-11-02 DIAGNOSIS — K21.9 GASTROESOPHAGEAL REFLUX DISEASE, UNSPECIFIED WHETHER ESOPHAGITIS PRESENT: ICD-10-CM

## 2021-11-02 PROCEDURE — 3079F DIAST BP 80-89 MM HG: CPT | Performed by: PHYSICIAN ASSISTANT

## 2021-11-02 PROCEDURE — 3008F BODY MASS INDEX DOCD: CPT | Performed by: PHYSICIAN ASSISTANT

## 2021-11-02 PROCEDURE — 3075F SYST BP GE 130 - 139MM HG: CPT | Performed by: PHYSICIAN ASSISTANT

## 2021-11-02 PROCEDURE — 99204 OFFICE O/P NEW MOD 45 MIN: CPT | Performed by: PHYSICIAN ASSISTANT

## 2021-11-02 PROCEDURE — 1036F TOBACCO NON-USER: CPT | Performed by: PHYSICIAN ASSISTANT

## 2021-11-15 ENCOUNTER — TELEPHONE (OUTPATIENT)
Dept: FAMILY MEDICINE CLINIC | Facility: CLINIC | Age: 51
End: 2021-11-15

## 2021-11-15 DIAGNOSIS — E55.9 VITAMIN D DEFICIENCY: ICD-10-CM

## 2021-11-15 DIAGNOSIS — I10 ESSENTIAL HYPERTENSION: Primary | ICD-10-CM

## 2021-11-15 DIAGNOSIS — N52.9 ERECTILE DYSFUNCTION, UNSPECIFIED ERECTILE DYSFUNCTION TYPE: ICD-10-CM

## 2021-11-15 RX ORDER — MELATONIN
1000 DAILY
Qty: 90 TABLET | Refills: 2 | Status: SHIPPED | OUTPATIENT
Start: 2021-11-15 | End: 2022-03-15 | Stop reason: SDUPTHER

## 2021-11-15 RX ORDER — SILDENAFIL 50 MG/1
50 TABLET, FILM COATED ORAL DAILY PRN
Qty: 10 TABLET | Refills: 0 | Status: SHIPPED | OUTPATIENT
Start: 2021-11-15 | End: 2021-12-14 | Stop reason: SDUPTHER

## 2021-11-15 RX ORDER — AMLODIPINE BESYLATE AND ATORVASTATIN CALCIUM 10; 10 MG/1; MG/1
1 TABLET, FILM COATED ORAL DAILY
Qty: 90 TABLET | Refills: 0 | Status: SHIPPED | OUTPATIENT
Start: 2021-11-15 | End: 2021-12-02 | Stop reason: SDUPTHER

## 2021-12-01 ENCOUNTER — TELEPHONE (OUTPATIENT)
Dept: PREADMISSION TESTING | Facility: HOSPITAL | Age: 51
End: 2021-12-01

## 2021-12-02 VITALS — BODY MASS INDEX: 31.98 KG/M2 | HEIGHT: 66 IN | WEIGHT: 199 LBS

## 2021-12-02 DIAGNOSIS — I10 ESSENTIAL HYPERTENSION: ICD-10-CM

## 2021-12-02 RX ORDER — AMLODIPINE BESYLATE AND ATORVASTATIN CALCIUM 10; 10 MG/1; MG/1
1 TABLET, FILM COATED ORAL DAILY
Qty: 90 TABLET | Refills: 0 | Status: SHIPPED | OUTPATIENT
Start: 2021-12-02 | End: 2021-12-14 | Stop reason: SDUPTHER

## 2021-12-02 NOTE — PRE-PROCEDURE INSTRUCTIONS
Pre-Surgery Instructions:   Medication Instructions    amLODIPine-atorvastatin (CADUET) 10-10 MG per tablet Take the am of the procedures    bisacodyl (Dulcolax) 5 mg EC tablet Patient was instructed by Physician and understands   cholecalciferol (VITAMIN D3) 1,000 units tablet Instructed patient per Anesthesia Guidelines   esomeprazole (NexIUM) 20 mg capsule Instructed patient per Anesthesia Guidelines   hydrOXYzine pamoate (VISTARIL) 25 mg capsule Instructed patient per Anesthesia Guidelines   levothyroxine 25 mcg tablet Take the am of the procedures    metFORMIN (GLUCOPHAGE) 500 mg tablet Instructed patient per Anesthesia Guidelines   Polyethylene Glycol 3350 (MIRALAX PO) Patient was instructed by Physician and understands   sildenafil (VIAGRA) 50 MG tablet Instructed patient per Anesthesia Guidelines  Pt to follow Dr Neli Garcia instructions  Pt to take caduet, levothyroxine the am of the procedures    Sejal Pal [phone]

## 2021-12-06 ENCOUNTER — ANESTHESIA EVENT (OUTPATIENT)
Dept: GASTROENTEROLOGY | Facility: AMBULARY SURGERY CENTER | Age: 51
End: 2021-12-06

## 2021-12-06 RX ORDER — SODIUM CHLORIDE, SODIUM LACTATE, POTASSIUM CHLORIDE, CALCIUM CHLORIDE 600; 310; 30; 20 MG/100ML; MG/100ML; MG/100ML; MG/100ML
125 INJECTION, SOLUTION INTRAVENOUS CONTINUOUS
Status: CANCELLED | OUTPATIENT
Start: 2021-12-06

## 2021-12-07 ENCOUNTER — HOSPITAL ENCOUNTER (OUTPATIENT)
Dept: GASTROENTEROLOGY | Facility: AMBULARY SURGERY CENTER | Age: 51
Setting detail: OUTPATIENT SURGERY
Discharge: HOME | End: 2021-12-07
Attending: INTERNAL MEDICINE
Payer: COMMERCIAL

## 2021-12-07 ENCOUNTER — ANESTHESIA (OUTPATIENT)
Dept: GASTROENTEROLOGY | Facility: AMBULARY SURGERY CENTER | Age: 51
End: 2021-12-07

## 2021-12-07 VITALS
DIASTOLIC BLOOD PRESSURE: 68 MMHG | WEIGHT: 199 LBS | RESPIRATION RATE: 18 BRPM | OXYGEN SATURATION: 97 % | TEMPERATURE: 96.9 F | HEIGHT: 66 IN | BODY MASS INDEX: 31.98 KG/M2 | HEART RATE: 72 BPM | SYSTOLIC BLOOD PRESSURE: 112 MMHG

## 2021-12-07 DIAGNOSIS — Z12.11 SCREENING FOR COLON CANCER: ICD-10-CM

## 2021-12-07 DIAGNOSIS — K21.9 GASTROESOPHAGEAL REFLUX DISEASE, UNSPECIFIED WHETHER ESOPHAGITIS PRESENT: ICD-10-CM

## 2021-12-07 LAB — GLUCOSE SERPL-MCNC: 97 MG/DL (ref 65–140)

## 2021-12-07 PROCEDURE — 82948 REAGENT STRIP/BLOOD GLUCOSE: CPT

## 2021-12-07 PROCEDURE — 88305 TISSUE EXAM BY PATHOLOGIST: CPT | Performed by: SPECIALIST

## 2021-12-07 PROCEDURE — 43239 EGD BIOPSY SINGLE/MULTIPLE: CPT | Performed by: INTERNAL MEDICINE

## 2021-12-07 PROCEDURE — 45385 COLONOSCOPY W/LESION REMOVAL: CPT | Performed by: INTERNAL MEDICINE

## 2021-12-07 RX ORDER — PROPOFOL 10 MG/ML
INJECTION, EMULSION INTRAVENOUS CONTINUOUS PRN
Status: DISCONTINUED | OUTPATIENT
Start: 2021-12-07 | End: 2021-12-07

## 2021-12-07 RX ORDER — LIDOCAINE HYDROCHLORIDE 10 MG/ML
INJECTION, SOLUTION EPIDURAL; INFILTRATION; INTRACAUDAL; PERINEURAL AS NEEDED
Status: DISCONTINUED | OUTPATIENT
Start: 2021-12-07 | End: 2021-12-07

## 2021-12-07 RX ORDER — SODIUM CHLORIDE, SODIUM LACTATE, POTASSIUM CHLORIDE, CALCIUM CHLORIDE 600; 310; 30; 20 MG/100ML; MG/100ML; MG/100ML; MG/100ML
INJECTION, SOLUTION INTRAVENOUS CONTINUOUS PRN
Status: DISCONTINUED | OUTPATIENT
Start: 2021-12-07 | End: 2021-12-07

## 2021-12-07 RX ORDER — PROPOFOL 10 MG/ML
INJECTION, EMULSION INTRAVENOUS AS NEEDED
Status: DISCONTINUED | OUTPATIENT
Start: 2021-12-07 | End: 2021-12-07

## 2021-12-07 RX ORDER — SODIUM CHLORIDE, SODIUM LACTATE, POTASSIUM CHLORIDE, CALCIUM CHLORIDE 600; 310; 30; 20 MG/100ML; MG/100ML; MG/100ML; MG/100ML
125 INJECTION, SOLUTION INTRAVENOUS CONTINUOUS
Status: DISCONTINUED | OUTPATIENT
Start: 2021-12-07 | End: 2021-12-11 | Stop reason: HOSPADM

## 2021-12-07 RX ADMIN — PROPOFOL 110 MCG/KG/MIN: 10 INJECTION, EMULSION INTRAVENOUS at 08:15

## 2021-12-07 RX ADMIN — PROPOFOL 80 MG: 10 INJECTION, EMULSION INTRAVENOUS at 08:15

## 2021-12-07 RX ADMIN — SODIUM CHLORIDE, SODIUM LACTATE, POTASSIUM CHLORIDE, AND CALCIUM CHLORIDE: .6; .31; .03; .02 INJECTION, SOLUTION INTRAVENOUS at 08:06

## 2021-12-07 RX ADMIN — PROPOFOL 40 MG: 10 INJECTION, EMULSION INTRAVENOUS at 08:18

## 2021-12-07 RX ADMIN — LIDOCAINE HYDROCHLORIDE 50 MG: 10 INJECTION, SOLUTION EPIDURAL; INFILTRATION; INTRACAUDAL; PERINEURAL at 08:15

## 2021-12-08 ENCOUNTER — RA CDI HCC (OUTPATIENT)
Dept: OTHER | Facility: HOSPITAL | Age: 51
End: 2021-12-08

## 2021-12-14 ENCOUNTER — OFFICE VISIT (OUTPATIENT)
Dept: FAMILY MEDICINE CLINIC | Facility: CLINIC | Age: 51
End: 2021-12-14
Payer: COMMERCIAL

## 2021-12-14 ENCOUNTER — TELEPHONE (OUTPATIENT)
Dept: ADMINISTRATIVE | Facility: OTHER | Age: 51
End: 2021-12-14

## 2021-12-14 ENCOUNTER — TELEPHONE (OUTPATIENT)
Dept: FAMILY MEDICINE CLINIC | Facility: CLINIC | Age: 51
End: 2021-12-14

## 2021-12-14 ENCOUNTER — LAB (OUTPATIENT)
Dept: LAB | Facility: CLINIC | Age: 51
End: 2021-12-14
Payer: COMMERCIAL

## 2021-12-14 VITALS
RESPIRATION RATE: 14 BRPM | SYSTOLIC BLOOD PRESSURE: 130 MMHG | TEMPERATURE: 98 F | DIASTOLIC BLOOD PRESSURE: 80 MMHG | WEIGHT: 196 LBS | HEIGHT: 66 IN | HEART RATE: 78 BPM | BODY MASS INDEX: 31.5 KG/M2

## 2021-12-14 DIAGNOSIS — E89.0 POSTOPERATIVE HYPOTHYROIDISM: ICD-10-CM

## 2021-12-14 DIAGNOSIS — F41.9 ANXIETY: ICD-10-CM

## 2021-12-14 DIAGNOSIS — E11.9 TYPE 2 DIABETES MELLITUS WITHOUT COMPLICATION, WITHOUT LONG-TERM CURRENT USE OF INSULIN (HCC): Primary | ICD-10-CM

## 2021-12-14 DIAGNOSIS — I10 ESSENTIAL HYPERTENSION: ICD-10-CM

## 2021-12-14 DIAGNOSIS — N52.9 ERECTILE DYSFUNCTION, UNSPECIFIED ERECTILE DYSFUNCTION TYPE: ICD-10-CM

## 2021-12-14 LAB
CREAT UR-MCNC: 213 MG/DL
MICROALBUMIN UR-MCNC: 8.9 MG/L (ref 0–20)
MICROALBUMIN/CREAT 24H UR: 4 MG/G CREATININE (ref 0–30)
SL AMB POCT HEMOGLOBIN AIC: 6.6 (ref ?–6.5)
TSH SERPL DL<=0.05 MIU/L-ACNC: 6.77 UIU/ML (ref 0.36–3.74)

## 2021-12-14 PROCEDURE — 3075F SYST BP GE 130 - 139MM HG: CPT | Performed by: FAMILY MEDICINE

## 2021-12-14 PROCEDURE — 83036 HEMOGLOBIN GLYCOSYLATED A1C: CPT | Performed by: FAMILY MEDICINE

## 2021-12-14 PROCEDURE — 1036F TOBACCO NON-USER: CPT | Performed by: FAMILY MEDICINE

## 2021-12-14 PROCEDURE — 84443 ASSAY THYROID STIM HORMONE: CPT

## 2021-12-14 PROCEDURE — 3079F DIAST BP 80-89 MM HG: CPT | Performed by: FAMILY MEDICINE

## 2021-12-14 PROCEDURE — 3044F HG A1C LEVEL LT 7.0%: CPT | Performed by: FAMILY MEDICINE

## 2021-12-14 PROCEDURE — 82043 UR ALBUMIN QUANTITATIVE: CPT | Performed by: FAMILY MEDICINE

## 2021-12-14 PROCEDURE — 3008F BODY MASS INDEX DOCD: CPT | Performed by: FAMILY MEDICINE

## 2021-12-14 PROCEDURE — 3061F NEG MICROALBUMINURIA REV: CPT | Performed by: FAMILY MEDICINE

## 2021-12-14 PROCEDURE — 99214 OFFICE O/P EST MOD 30 MIN: CPT | Performed by: FAMILY MEDICINE

## 2021-12-14 PROCEDURE — 36415 COLL VENOUS BLD VENIPUNCTURE: CPT

## 2021-12-14 PROCEDURE — 82570 ASSAY OF URINE CREATININE: CPT | Performed by: FAMILY MEDICINE

## 2021-12-14 RX ORDER — SILDENAFIL 50 MG/1
50 TABLET, FILM COATED ORAL DAILY PRN
Qty: 10 TABLET | Refills: 2 | Status: SHIPPED | OUTPATIENT
Start: 2021-12-14 | End: 2022-03-15 | Stop reason: SDUPTHER

## 2021-12-14 RX ORDER — AMLODIPINE BESYLATE AND ATORVASTATIN CALCIUM 10; 10 MG/1; MG/1
1 TABLET, FILM COATED ORAL DAILY
Qty: 90 TABLET | Refills: 3 | Status: SHIPPED | OUTPATIENT
Start: 2021-12-14 | End: 2022-03-15 | Stop reason: SDUPTHER

## 2021-12-14 RX ORDER — HYDROXYZINE PAMOATE 25 MG/1
25 CAPSULE ORAL 3 TIMES DAILY PRN
Qty: 90 CAPSULE | Refills: 2 | Status: SHIPPED | OUTPATIENT
Start: 2021-12-14 | End: 2022-03-15 | Stop reason: SDUPTHER

## 2021-12-14 RX ORDER — LEVOTHYROXINE SODIUM 0.03 MG/1
25 TABLET ORAL DAILY
Qty: 90 TABLET | Refills: 3 | Status: SHIPPED | OUTPATIENT
Start: 2021-12-14 | End: 2021-12-14 | Stop reason: SDUPTHER

## 2022-01-17 ENCOUNTER — TELEPHONE (OUTPATIENT)
Dept: FAMILY MEDICINE CLINIC | Facility: CLINIC | Age: 52
End: 2022-01-17

## 2022-03-15 ENCOUNTER — APPOINTMENT (OUTPATIENT)
Dept: LAB | Facility: CLINIC | Age: 52
End: 2022-03-15
Payer: COMMERCIAL

## 2022-03-15 ENCOUNTER — OFFICE VISIT (OUTPATIENT)
Dept: FAMILY MEDICINE CLINIC | Facility: CLINIC | Age: 52
End: 2022-03-15
Payer: COMMERCIAL

## 2022-03-15 VITALS
RESPIRATION RATE: 16 BRPM | DIASTOLIC BLOOD PRESSURE: 90 MMHG | WEIGHT: 205 LBS | HEART RATE: 68 BPM | SYSTOLIC BLOOD PRESSURE: 124 MMHG | HEIGHT: 66 IN | BODY MASS INDEX: 32.95 KG/M2 | TEMPERATURE: 97.7 F

## 2022-03-15 DIAGNOSIS — E89.0 POSTOPERATIVE HYPOTHYROIDISM: ICD-10-CM

## 2022-03-15 DIAGNOSIS — N52.9 ERECTILE DYSFUNCTION, UNSPECIFIED ERECTILE DYSFUNCTION TYPE: ICD-10-CM

## 2022-03-15 DIAGNOSIS — K21.9 GASTROESOPHAGEAL REFLUX DISEASE, UNSPECIFIED WHETHER ESOPHAGITIS PRESENT: ICD-10-CM

## 2022-03-15 DIAGNOSIS — I10 ESSENTIAL HYPERTENSION: ICD-10-CM

## 2022-03-15 DIAGNOSIS — Z23 NEED FOR VACCINATION: ICD-10-CM

## 2022-03-15 DIAGNOSIS — E11.9 TYPE 2 DIABETES MELLITUS WITHOUT COMPLICATION, WITHOUT LONG-TERM CURRENT USE OF INSULIN (HCC): Primary | ICD-10-CM

## 2022-03-15 DIAGNOSIS — E55.9 VITAMIN D DEFICIENCY: ICD-10-CM

## 2022-03-15 DIAGNOSIS — C02.9 TONGUE CANCER (HCC): ICD-10-CM

## 2022-03-15 DIAGNOSIS — H93.13 TINNITUS OF BOTH EARS: ICD-10-CM

## 2022-03-15 DIAGNOSIS — F41.9 ANXIETY: ICD-10-CM

## 2022-03-15 LAB
ANION GAP SERPL CALCULATED.3IONS-SCNC: 5 MMOL/L (ref 4–13)
BUN SERPL-MCNC: 9 MG/DL (ref 5–25)
CALCIUM SERPL-MCNC: 9 MG/DL (ref 8.3–10.1)
CHLORIDE SERPL-SCNC: 108 MMOL/L (ref 100–108)
CO2 SERPL-SCNC: 27 MMOL/L (ref 21–32)
CREAT SERPL-MCNC: 0.86 MG/DL (ref 0.6–1.3)
GFR SERPL CREATININE-BSD FRML MDRD: 100 ML/MIN/1.73SQ M
GLUCOSE P FAST SERPL-MCNC: 97 MG/DL (ref 65–99)
POTASSIUM SERPL-SCNC: 3.8 MMOL/L (ref 3.5–5.3)
SL AMB POCT HEMOGLOBIN AIC: 7.7 (ref ?–6.5)
SODIUM SERPL-SCNC: 140 MMOL/L (ref 136–145)
TSH SERPL DL<=0.05 MIU/L-ACNC: 2.83 UIU/ML (ref 0.36–3.74)

## 2022-03-15 PROCEDURE — 90732 PPSV23 VACC 2 YRS+ SUBQ/IM: CPT | Performed by: FAMILY MEDICINE

## 2022-03-15 PROCEDURE — 84443 ASSAY THYROID STIM HORMONE: CPT

## 2022-03-15 PROCEDURE — 3008F BODY MASS INDEX DOCD: CPT | Performed by: FAMILY MEDICINE

## 2022-03-15 PROCEDURE — 80048 BASIC METABOLIC PNL TOTAL CA: CPT

## 2022-03-15 PROCEDURE — 99214 OFFICE O/P EST MOD 30 MIN: CPT | Performed by: FAMILY MEDICINE

## 2022-03-15 PROCEDURE — 3051F HG A1C>EQUAL 7.0%<8.0%: CPT | Performed by: FAMILY MEDICINE

## 2022-03-15 PROCEDURE — 83036 HEMOGLOBIN GLYCOSYLATED A1C: CPT | Performed by: FAMILY MEDICINE

## 2022-03-15 PROCEDURE — 90471 IMMUNIZATION ADMIN: CPT | Performed by: FAMILY MEDICINE

## 2022-03-15 PROCEDURE — 36415 COLL VENOUS BLD VENIPUNCTURE: CPT

## 2022-03-15 PROCEDURE — 3080F DIAST BP >= 90 MM HG: CPT | Performed by: FAMILY MEDICINE

## 2022-03-15 RX ORDER — HYDROXYZINE PAMOATE 25 MG/1
25 CAPSULE ORAL 3 TIMES DAILY PRN
Qty: 90 CAPSULE | Refills: 2 | Status: SHIPPED | OUTPATIENT
Start: 2022-03-15

## 2022-03-15 RX ORDER — AMLODIPINE BESYLATE AND ATORVASTATIN CALCIUM 10; 10 MG/1; MG/1
1 TABLET, FILM COATED ORAL DAILY
Qty: 90 TABLET | Refills: 3 | Status: SHIPPED | OUTPATIENT
Start: 2022-03-15

## 2022-03-15 RX ORDER — SILDENAFIL 50 MG/1
50 TABLET, FILM COATED ORAL DAILY PRN
Qty: 10 TABLET | Refills: 2 | Status: SHIPPED | OUTPATIENT
Start: 2022-03-15

## 2022-03-15 RX ORDER — MELATONIN
1000 DAILY
Qty: 90 TABLET | Refills: 2 | Status: SHIPPED | OUTPATIENT
Start: 2022-03-15

## 2022-03-15 NOTE — PROGRESS NOTES
Audie Mirza 1970 male MRN: [id number]    FAMILY PRACTICE OFFICE VISIT  [facility] - [address]      ASSESSMENT/PLAN  Audie Mirza is a 46 y o  male presents to the office for    Diagnoses and all orders for this visit:    Type 2 diabetes mellitus without complication, without long-term current use of insulin (HCC)  -     POCT hemoglobin A1c  -     metFORMIN (GLUCOPHAGE) 500 mg tablet; Take 1 tablet (500 mg total) by mouth 2 (two) times a day with meals    Tinnitus of both ears  -     Ambulatory Referral to Otolaryngology; Future    Postoperative hypothyroidism  -     TSH, 3rd generation; Future  -     US thyroid; Future    Vitamin D deficiency  -     cholecalciferol (VITAMIN D3) 1,000 units tablet; Take 1 tablet (1,000 Units total) by mouth daily    Tongue cancer (HCC)    Anxiety  -     hydrOXYzine pamoate (VISTARIL) 25 mg capsule; Take 1 capsule (25 mg total) by mouth 3 (three) times a day as needed for anxiety    Erectile dysfunction, unspecified erectile dysfunction type  -     sildenafil (VIAGRA) 50 MG tablet; Take 1 tablet (50 mg total) by mouth daily as needed for erectile dysfunction    Essential hypertension  -     amLODIPine-atorvastatin (CADUET) 10-10 MG per tablet; Take 1 tablet by mouth daily  -     Basic metabolic panel; Future    Gastroesophageal reflux disease, unspecified whether esophagitis present  -     esomeprazole (NexIUM) 20 mg capsule; Take 1 capsule (20 mg total) by mouth daily in the early morning    Need for vaccination  -     PNEUMOCOCCAL POLYSACCHARIDE VACCINE 23-VALENT =>1YO SQ IM    A1c showed slight worsening at 7 7%  Recommend a six-month follow-up  No changes to be made given that the patient will be changing his habits    Metformin will need to be increased to a 1000 twice a day if it continues to persist    Tinnitus of both ears secondary to COVID recommend that the patient be evaluated by ENT    History of tongue cancer with a recent diagnosis of postoperative hypothyroidism recommend a repeat TSH  Just recently switched to 50 mcg    Continue on vitamin-D daily    Anxiety currently controlled    Hypertension currently controlled    Acid reflux currently controlled    Pneumovax 23 given today             No future appointments  Relocating at the end of June      SUBJECTIVE  CC: Diabetes and Follow-up (pt thinks he had covid in january still has ringing in his ears)      HPI:  Babar Burns is a 46 y o  male who presents for chronic conditions  Patient recently diagnosed with type 2 diabetes and hypothyroidism  He is currently taking his medications without any difficulties  Patient states that he did not take care of himself over the holidays and believes that his A1c will worsen today  He states that he thinks he had COVID in January  Continues to have a constant persistent ringing in both ears  Would like refill of all his medications if possible  He is going to be using the [facility] across the street  Patient will be relocating at the end of June  History of tongue cancer in remission  Erectile dysfunction controlled on medications as needed         Review of Systems   Constitutional: Negative for activity change, appetite change, chills, fatigue and fever  HENT: Negative for congestion  Respiratory: Negative for cough, chest tightness and shortness of breath  Cardiovascular: Negative for chest pain and leg swelling  Gastrointestinal: Negative for abdominal distention, abdominal pain, constipation, diarrhea, nausea and vomiting  All other systems reviewed and are negative        Historical Information   The patient history was reviewed as follows:  Past Medical History:   Diagnosis Date    [facility]    base of the tongue-into the lymph nodes left side of the neck-chemo/rad    [facility]     type 2    Disease of thyroid gland     hypo    History of chemotherapy 11/2010    for cancer of the base of the tongue w/mets to the left neck lymph nodes    History of radiation therapy     neck    Hypertension     Wears glasses          Medications:     Current Outpatient Medications:     amLODIPine-atorvastatin (CADUET) 10-10 MG per tablet, Take 1 tablet by mouth daily, Disp: 90 tablet, Rfl: 3    cholecalciferol (VITAMIN D3) 1,000 units tablet, Take 1 tablet (1,000 Units total) by mouth daily, Disp: 90 tablet, Rfl: 2    esomeprazole (NexIUM) 20 mg capsule, Take 1 capsule (20 mg total) by mouth daily in the early morning, Disp: 90 capsule, Rfl: 1    hydrOXYzine pamoate (VISTARIL) 25 mg capsule, Take 1 capsule (25 mg total) by mouth 3 (three) times a day as needed for anxiety, Disp: 90 capsule, Rfl: 2    levothyroxine 50 mcg tablet, Take 1 tablet (50 mcg total) by mouth daily, Disp: 90 tablet, Rfl: 1    Polyethylene Glycol 3350 (MIRALAX PO), Take by mouth once, Disp: , Rfl:     sildenafil (VIAGRA) 50 MG tablet, Take 1 tablet (50 mg total) by mouth daily as needed for erectile dysfunction, Disp: 10 tablet, Rfl: 2    metFORMIN (GLUCOPHAGE) 500 mg tablet, Take 1 tablet (500 mg total) by mouth 2 (two) times a day with meals, Disp: 180 tablet, Rfl: 2    No Known Allergies    OBJECTIVE  Vitals:   Vitals:    03/15/22 0752   BP: 124/90   BP Location: Left arm   Patient Position: Sitting   Cuff Size: Large   Pulse: 68   Resp: 16   Temp: 97 7 °F (36 5 °C)   Weight: 93 kg (205 lb)   Height: 5' 6" (1 676 m)         Physical Exam  Vitals reviewed  Constitutional:       Appearance: He is well-developed  HENT:      Head: Normocephalic and atraumatic  Eyes:      Conjunctiva/sclera: Conjunctivae normal       Pupils: Pupils are equal, round, and reactive to light  Cardiovascular:      Rate and Rhythm: Normal rate and regular rhythm  Heart sounds: Normal heart sounds  Pulmonary:      Effort: Pulmonary effort is normal  No respiratory distress  Breath sounds: Normal breath sounds  Musculoskeletal:         General: Normal range of motion  Cervical back: Normal range of motion and neck supple  Skin:     General: Skin is warm  Capillary Refill: Capillary refill takes less than 2 seconds  Neurological:      Mental Status: He is alert and oriented to person, place, and time  Psychiatric:         Mood and Affect: Mood normal          Behavior: Behavior normal          Thought Content:  Thought content normal          Judgment: Judgment normal                     Stephen Lawrence MD,   [facility]  3/15/2022

## 2022-08-15 DIAGNOSIS — F41.9 ANXIETY: ICD-10-CM

## 2022-08-15 RX ORDER — HYDROXYZINE PAMOATE 25 MG/1
CAPSULE ORAL
Qty: 90 CAPSULE | Refills: 2 | Status: SHIPPED | OUTPATIENT
Start: 2022-08-15 | End: 2022-09-17

## 2022-09-17 DIAGNOSIS — F41.9 ANXIETY: ICD-10-CM

## 2022-09-17 RX ORDER — HYDROXYZINE PAMOATE 25 MG/1
CAPSULE ORAL
Qty: 90 CAPSULE | Refills: 2 | Status: SHIPPED | OUTPATIENT
Start: 2022-09-17

## 2022-09-21 DIAGNOSIS — E89.0 POSTOPERATIVE HYPOTHYROIDISM: ICD-10-CM

## 2022-09-21 RX ORDER — LEVOTHYROXINE SODIUM 0.05 MG/1
50 TABLET ORAL DAILY
Qty: 90 TABLET | Refills: 1 | Status: SHIPPED | OUTPATIENT
Start: 2022-09-21 | End: 2022-09-23

## 2022-10-04 ENCOUNTER — TELEPHONE (OUTPATIENT)
Dept: GASTROENTEROLOGY | Facility: AMBULARY SURGERY CENTER | Age: 52
End: 2022-10-04

## 2022-12-22 ENCOUNTER — TELEPHONE (OUTPATIENT)
Dept: GASTROENTEROLOGY | Facility: AMBULARY SURGERY CENTER | Age: 52
End: 2022-12-22

## 2023-01-22 DIAGNOSIS — E11.9 TYPE 2 DIABETES MELLITUS WITHOUT COMPLICATION, WITHOUT LONG-TERM CURRENT USE OF INSULIN (HCC): ICD-10-CM

## 2023-03-08 NOTE — ADDENDUM NOTE
Addended byBonni Gitelman on: 9/14/2021 11:00 AM     Modules accepted: Level of Service Anesthesia monitoring vitals, medications and patient condition. See charting

## 2023-04-08 DIAGNOSIS — E89.0 POSTOPERATIVE HYPOTHYROIDISM: ICD-10-CM

## 2023-04-08 RX ORDER — LEVOTHYROXINE SODIUM 0.05 MG/1
TABLET ORAL
Qty: 90 TABLET | Refills: 1 | Status: SHIPPED | OUTPATIENT
Start: 2023-04-08

## 2023-04-28 DIAGNOSIS — I10 ESSENTIAL HYPERTENSION: ICD-10-CM

## 2023-04-28 RX ORDER — AMLODIPINE BESYLATE AND ATORVASTATIN CALCIUM 10; 10 MG/1; MG/1
1 TABLET, FILM COATED ORAL DAILY
Qty: 90 TABLET | Refills: 2 | Status: SHIPPED | OUTPATIENT
Start: 2023-04-28

## 2023-08-06 DIAGNOSIS — E11.9 TYPE 2 DIABETES MELLITUS WITHOUT COMPLICATION, WITHOUT LONG-TERM CURRENT USE OF INSULIN (HCC): ICD-10-CM

## 2023-09-04 DIAGNOSIS — E11.9 TYPE 2 DIABETES MELLITUS WITHOUT COMPLICATION, WITHOUT LONG-TERM CURRENT USE OF INSULIN (HCC): ICD-10-CM

## 2023-09-11 NOTE — TELEPHONE ENCOUNTER
[address] [place] [address]. Also mailed letter for patient to call and schedule appointment.